# Patient Record
Sex: MALE | Race: WHITE | NOT HISPANIC OR LATINO | Employment: FULL TIME | URBAN - METROPOLITAN AREA
[De-identification: names, ages, dates, MRNs, and addresses within clinical notes are randomized per-mention and may not be internally consistent; named-entity substitution may affect disease eponyms.]

---

## 2021-12-08 ENCOUNTER — HOSPITAL ENCOUNTER (EMERGENCY)
Facility: CLINIC | Age: 21
Discharge: HOME | End: 2021-12-08
Attending: EMERGENCY MEDICINE | Admitting: EMERGENCY MEDICINE
Payer: COMMERCIAL

## 2021-12-08 ENCOUNTER — TELEPHONE (OUTPATIENT)
Dept: EMERGENCY MEDICINE | Facility: CLINIC | Age: 21
End: 2021-12-08

## 2021-12-08 VITALS
HEART RATE: 76 BPM | RESPIRATION RATE: 18 BRPM | SYSTOLIC BLOOD PRESSURE: 169 MMHG | TEMPERATURE: 97.8 F | OXYGEN SATURATION: 99 % | DIASTOLIC BLOOD PRESSURE: 90 MMHG | WEIGHT: 315 LBS

## 2021-12-08 DIAGNOSIS — Z20.822 PERSON UNDER INVESTIGATION FOR COVID-19: ICD-10-CM

## 2021-12-08 DIAGNOSIS — B34.9 VIRAL SYNDROME: ICD-10-CM

## 2021-12-08 LAB
FLUAV RNA SPEC QL NAA+PROBE: NEGATIVE
FLUBV RNA RESP QL NAA+PROBE: NEGATIVE
SARS-COV-2 RNA RESP QL NAA+PROBE: POSITIVE

## 2021-12-08 PROCEDURE — 99283 EMERGENCY DEPT VISIT LOW MDM: CPT

## 2021-12-08 PROCEDURE — C9803 HOPD COVID-19 SPEC COLLECT: HCPCS

## 2021-12-08 PROCEDURE — 87636 SARSCOV2 & INF A&B AMP PRB: CPT | Performed by: EMERGENCY MEDICINE

## 2021-12-08 ASSESSMENT — ENCOUNTER SYMPTOMS
MYALGIAS: 1
JOINT SWELLING: 0
NAUSEA: 0
CONFUSION: 0
SHORTNESS OF BREATH: 0
CHILLS: 0
COUGH: 1
DIZZINESS: 0
HEADACHES: 1
SORE THROAT: 0
FEVER: 0
HEMATURIA: 0
ABDOMINAL PAIN: 0
FATIGUE: 1
VOMITING: 0
DYSURIA: 0
DIARRHEA: 0

## 2021-12-08 NOTE — ED TRIAGE NOTES
Pt presents to the ED with c/o fever, chills, body aches, and cough. Pt had negative COVID on Sunday 12/5/21. Pt unvaccinated against COVID.

## 2021-12-08 NOTE — Clinical Note
Chuck Meadows was seen and treated in our emergency department on 12/8/2021.  He may return to work on 12/13/2021.       If you have any questions or concerns, please don't hesitate to call.      Narayan Storey MD

## 2021-12-09 NOTE — TELEPHONE ENCOUNTER
"-Coronavirus (COVID-19) Notification    I informed him I can transfer him to dept that needs to update his address w/ correct apt. He needed an email, I mentioned I couldn't not supply that for him due to HIPAA, encouraged him to sign up for [employer].    Caller Name (Patient, parent, daughter/son, grandparent, etc)  Pt    Reason for call  Notify of Positive Coronavirus (COVID-19) lab results, assess symptoms,  review [facility] recommendations    Lab Result    Lab test:  2019-nCoV rRt-PCR or SARS-CoV-2 PCR    Oropharyngeal AND/OR nasopharyngeal swabs is POSITIVE for 2019-nCoV RNA/SARS-COV-2 PCR (COVID-19 virus)    RN Recommendations/Instructions per [facility] Coronavirus COVID-19 recommendations    Brief introduction script  Introduce self then review script:  \"I am calling on behalf of [employer].  We were notified that your Coronavirus test (COVID-19) for was POSITIVE for the virus.  I have some information to relay to you but first I wanted to mention that the [place] Dept of Health will be contacting you shortly [it's possible MD already called Patient] to talk to you more about how you are feeling and other people you have had contact with who might now also have the virus.  Also, [facility] is Partnering with the [facility] for Covid-19 research, you may be contacted directly by research staff.\"    Assessment (Inquire about Patient's current symptoms)   Assessment   Current Symptoms at time of phone call: (if no symptoms, document No symptoms] Cough, body aches, sore throat, congestion, headache, nausea, vomit x1   Symptoms onset (if applicable) 12/6     If at time of call, Patients symptoms hare worsened, the Patient should contact 911 or have someone drive them to Emergency Dept promptly:      If Patient calling 911, inform 911 personal that you have tested positive for the Coronavirus (COVID-19).  Place mask on and await 911 to arrive.    If Emergency Dept, If possible, please " "have another adult drive you to the Emergency Dept but you need to wear mask when in contact with other people.      Monoclonal Antibody Administration    You may be eligible to receive a new treatment with a monoclonal antibody for preventing hospitalization in patients at high risk for complications from COVID-19.   This medication is still experimental and available on a limited basis; it is given through an IV and must be given at an infusion center. Please note that not all people who are eligible will receive the medication since it is in limited supply.     Are you interested in being considered for this medication?  No.   Does the patient fit the criteria: Patient declined    If patient qualifies based on above criteria:  \"You will be contacted if you are selected to receive this treatment in the next 1-2 business days.   This is time sensitive and if you are not selected in the next 1-2 business days, you will not receive the medication.  If you do not receive a call to schedule, you have not been selected.\"      Review information with Patient    Your result was positive. This means you have COVID-19 (coronavirus).  We have sent you a letter that reviews the information that I'll be reviewing with you now.    How can I protect others?    If you have symptoms: stay home and away from others (self-isolate) until:    You've had no fever--and no medicine that reduces fever--for 1 full day (24 hours). And       Your other symptoms have gotten better. For example, your cough or breathing has improved. And     At least 10 days have passed since your symptoms started. (If you've been told by a doctor that you have a weak immune system, wait 20 days.)     If you don't have symptoms: Stay home and away from others (self-isolate) until at least 10 days have passed since your first positive COVID-19 test. (Date test collected)    During this time:    Stay in your own room, including for meals. Use your own bathroom if " you can.    Stay away from others in your home. No hugging, kissing or shaking hands. No visitors.     Don't go to work, school or anywhere else.     Clean  high touch  surfaces often (doorknobs, counters, handles, etc.). Use a household cleaning spray or wipes. You'll find a full list on the EPA website at www.epa.gov/pesticide-registration/list-n-disinfectants-use-against-sars-cov-2.     Cover your mouth and nose with a mask, tissue or other face covering to avoid spreading germs.    Wash your hands and face often with soap and water.    Make a list of people you have been in close contact with recently, even if either of you wore a face covering.   ; Start your list from 2 days before you became ill or had a positive test.  ; Include anyone that was within 6 feet of you for a cumulative total of 15 minutes or more in 24 hours. (Example: if you sat next to Dejan for 5 minutes in the morning and 10 minutes in the afternoon, then you were in close contact for 15 minutes total that day. Dejan would be added to your list.)    A public health worker will call or text you. It is important that you answer. They will ask you questions about possible exposures to COVID-19, such as people you have been in direct contact with and places you have visited.    Tell the people on your list that you have COVID-19; they should stay away from others for 14 days starting from the last time they were in contact with you (unless you are told something different from a public health worker).     Caregivers in these groups are at risk for severe illness due to COVID-19:  o People 65 years and older  o People who live in a nursing home or long-term care facility  o People with chronic disease (lung, heart, cancer, diabetes, kidney, liver, immunologic)  o People who have a weakened immune system, including those who:  - Are in cancer treatment  - Take medicine that weakens the immune system, such as corticosteroids  - Had a bone marrow or  organ transplant  - Have an immune deficiency  - Have poorly controlled HIV or AIDS  - Are obese (body mass index of 40 or higher)  - Smoke regularly    Caregivers should wear gloves while washing dishes, handling laundry and cleaning bedrooms and bathrooms.    Wash and dry laundry with special caution. Don't shake dirty laundry, and use the warmest water setting you can.    If you have a weakened immune system, ask your doctor about other actions you should take.    For more tips, go to www.cdc.gov/coronavirus/2019-ncov/downloads/10Things.pdf.    You should not go back to work until you meet the guidelines above for ending your home isolation. You don't need to be retested for COVID-19 before going back to work--studies show that you won't spread the virus if it's been at least 10 days since your symptoms started (or 20 days, if you have a weak immune system).    Employers: This document serves as formal notice of your employee's medical guidelines for going back to work. They must meet the above guidelines before going back to work in person.    How can I take care of myself?    1. Get lots of rest. Drink extra fluids (unless a doctor has told you not to).    2. Take Tylenol (acetaminophen) for fever or pain. If you have liver or kidney problems, ask your family doctor if it's okay to take Tylenol.     Take either:     650 mg (two 325 mg pills) every 4 to 6 hours, or     1,000 mg (two 500 mg pills) every 8 hours as needed.     Note: Don't take more than 3,000 mg in one day. Acetaminophen is found in many medicines (both prescribed and over-the-counter medicines). Read all labels to be sure you don't take too much.    For children, check the Tylenol bottle for the right dose (based on their age or weight).    3. If you have other health problems (like cancer, heart failure, an organ transplant or severe kidney disease): Call your specialty clinic if you don't feel better in the next 2 days.    4. Know when to call  911: Emergency warning signs include:    Trouble breathing or shortness of breath    Pain or pressure in the chest that doesn't go away    Feeling confused like you haven't felt before, or not being able to wake up    Bluish-colored lips or face    5. Sign up for [employer]. We know it's scary to hear that you have COVID-19. We want to track your symptoms to make sure you're okay over the next 2 weeks. Please look for an email from [employer]--this is a free, online program that we'll use to keep in touch. To sign up, follow the link in the email. Learn more at [email or web address].    Where can I get more information?    [facility]: [email or web address]    Coronavirus Basics: www.health.[facility].[place]/diseases/coronavirus/basics.html    What to Do If You're Sick: www.cdc.gov/coronavirus/2019-ncov/about/steps-when-sick.html    Ending Home Isolation: www.cdc.gov/coronavirus/2019-ncov/hcp/disposition-in-home-patients.html     Caring for Someone with COVID-19: www.cdc.gov/coronavirus/2019-ncov/if-you-are-sick/care-for-someone.html     [facility] clinical trials (COVID-19 research studies): clinicalaffairs.[facility]/umn-clinical-trials     A Positive COVID-19 letter will be sent via [employer] or the mail. (Exception, no letters sent to Presurgerical/Preprocedure Patients)    Tali Gonzalez

## 2021-12-09 NOTE — ED PROVIDER NOTES
Emergency Department Encounter     Evaluation Date & Time:   No admission date for patient encounter.    CHIEF COMPLAINT:  Fever, Cough, and Generalized Body Aches      Triage Note:Pt presents to the ED with c/o fever, chills, body aches, and cough. Pt had negative COVID on Sunday 12/5/21. Pt unvaccinated against COVID.         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  Pt has no cp/sob, vomiting, loss of sense of taste/smell.  Covid/flu test done already and still pending.  Pt will follow up on this, work note provided for tomorrow as it's his last day of work this week.  Will return to work when improving and if covid negative.  Pt will continue tylenol/ibuprofen, return to ED for worsening concerns. Vitals here all normal, clear lungs, appropriate for discharge.        6:12 PM I met with patient for initial interview and encounter. PPE worn includes N95 mask, surgical mask, goggles.    At the conclusion of the encounter I discussed the results of all the tests and the disposition. The questions were answered. The patient or family acknowledged understanding and was agreeable with the care plan.      MEDICATIONS GIVEN IN THE EMERGENCY DEPARTMENT:  Medications - No data to display    NEW PRESCRIPTIONS STARTED AT TODAY'S ED VISIT:  There are no discharge medications for this patient.      HPI   HPI     Chuck Meadows is a 21 year old male with no pertinent history who presents to this ED via walk-in for evaluation of fever, cough.    Patient reports he has had ongoing cough, congestion, headache, body aches, and fatigue for the past 2 days. He has been treating his symptoms with tylenol. He reports having a negative COVID-19 test on 12/5 (3 days ago) before he attended an event. States he thinks he has been around sick people, but denies any specific exposures.  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     Patient has not been vaccinated against COVID-19.    Patient denies any loss of taste or smell, nausea, vomiting, diarrhea, chest pain, shortness of breath, abdominal pain, or any other complaints.    REVIEW OF SYSTEMS:  Review of Systems   Constitutional: Positive for fatigue. Negative for chills and fever.   HENT: Positive for congestion. Negative for sore throat.         Negative for loss of taste or smell   Eyes: Negative for visual disturbance.   Respiratory: Positive for cough. Negative for shortness of breath.    Cardiovascular: Negative for chest pain.   Gastrointestinal: Negative for abdominal pain, diarrhea, nausea and vomiting.   Endocrine: Negative for polyuria.   Genitourinary: Negative for dysuria and hematuria.        - urinary changes     Musculoskeletal: Positive for myalgias. Negative for joint swelling.   Skin: Negative for rash.   Neurological: Positive for headaches. Negative for dizziness.   Psychiatric/Behavioral: Negative for confusion.   All other systems reviewed and are negative.          Medical History   History reviewed. No pertinent past medical history.    History reviewed. No pertinent surgical history.    No family history on file.    Social History     Tobacco Use     Smoking status: None     Smokeless tobacco: None   Substance Use Topics     Alcohol use: None     Drug use: None       No current outpatient medications on file.      Physical Exam       Vitals:  BP (!) 169/90   Pulse 76   Temp 97.8  F (36.6  C) (Oral)   Resp 18   Wt (!) 154.2 kg (340 lb)   SpO2 99%     PHYSICAL EXAM:   Physical Exam  Vitals and nursing note reviewed.   Constitutional:       General: He is not in acute distress.     Appearance: Normal appearance.   HENT:      Head: Normocephalic and atraumatic.      Nose: Nose normal.      Mouth/Throat:      Mouth: Mucous membranes are  moist.   Eyes:      Extraocular Movements: Extraocular movements intact.   Cardiovascular:      Rate and Rhythm: Normal rate and regular rhythm.      Pulses: Normal pulses.           Radial pulses are 2+ on the right side and 2+ on the left side.        Dorsalis pedis pulses are 2+ on the right side and 2+ on the left side.   Pulmonary:      Effort: Pulmonary effort is normal.   Skin:     Findings: No rash.   Neurological:      General: No focal deficit present.      Mental Status: He is alert. Mental status is at baseline.      Comments: Fluent speech   Psychiatric:         Mood and Affect: Mood normal.         Behavior: Behavior normal.         Results     LAB:  All pertinent labs reviewed and interpreted  Labs Ordered and Resulted from Time of ED Arrival to Time of ED Departure - No data to display    RADIOLOGY:  No orders to display                  PROCEDURES:  Procedures:  none      FINAL IMPRESSION:    ICD-10-CM    1. Person under investigation for COVID-19  Z20.822    2. Viral syndrome  B34.9        0 minutes of critical care time      I, Andrew Maciel, am serving as a scribe to document services personally performed by Dr. Narayan Storey, based on my observations and the provider's statements to me. INarayan, DO attest that Andrew Maciel is acting in a scribe capacity, has observed my performance of the services and has documented them in accordance with my direction.      Narayan Storey DO  Emergency Medicine  [facility] EMERGENCY ROOM  12/8/2021  6:12 PM        Narayan Storey MD  12/08/21 [id number]

## 2021-12-09 NOTE — DISCHARGE INSTRUCTIONS
Continue tylenol and alternate ibuprofen 600-800mg with food 3 times a day. Tylenol up to 1000mg up to 4 times a day.  Rest, drink plenty of fluids.  Follow up on covid/flu results on mychart.  Return to work when improving symptomatically.

## 2021-12-12 ENCOUNTER — HEALTH MAINTENANCE LETTER (OUTPATIENT)
Age: 21
End: 2021-12-12

## 2021-12-14 ENCOUNTER — TELEPHONE (OUTPATIENT)
Dept: NURSING | Facility: CLINIC | Age: 21
End: 2021-12-14
Payer: COMMERCIAL

## 2021-12-14 NOTE — TELEPHONE ENCOUNTER
Chuck has Covid    He wants to know when he can be vaccinated    Notified per CDC:  People with COVID-19 who have symptoms should wait to be vaccinated until they have recovered from their illness and have met the criteria for discontinuing isolation    Notified of CDC criteria:  You can be around others after:    - 10 days since symptoms first appeared and  - 24 hours with no fever without the use of fever-reducing medications and  - Other symptoms of COVID-19 are improving    COVID 19 Nurse Triage Plan/Patient Instructions    Please be aware that novel coronavirus (COVID-19) may be circulating in the community. If you develop symptoms such as fever, cough, or SOB or if you have concerns about the presence of another infection including coronavirus (COVID-19), please contact your health care provider or visit [email or web address].     Disposition/Instructions    Thank you for taking steps to prevent the spread of this virus.  o Limit your contact with others.  o Wear a simple mask to cover your cough.  o Wash your hands well and often.    Resources    [facility]: About COVID-19: [email or web address]    CDC: What to Do If You're Sick: www.cdc.gov/coronavirus/2019-ncov/about/steps-when-sick.html    CDC: Ending Home Isolation: www.cdc.gov/coronavirus/2019-ncov/hcp/disposition-in-home-patients.html     CDC: Caring for Someone: www.cdc.gov/coronavirus/2019-ncov/if-you-are-sick/care-for-someone.html     [facility]: Interim Guidance for Hospital Discharge to Home: www.health.[facility].[place]/diseases/coronavirus/hcp/hospdischarge.pdf    [facility] clinical trials (COVID-19 research studies): clinicalaffairs.[facility]-clinical-trials     Below are the COVID-19 hotlines at the [place] Department of Health ([facility]). Interpreters are available.   o For health questions: Call 526-111-0327 or 1-142.299.8033 (7 a.m. to 7 p.m.)  o For questions about schools and childcare: Call 160-248-8305 or  1-616-113-1383 (7 a.m. to 7 p.m.)     Camille Huerta RN  [facility]

## 2022-10-03 ENCOUNTER — HEALTH MAINTENANCE LETTER (OUTPATIENT)
Age: 22
End: 2022-10-03

## 2022-10-24 ENCOUNTER — HOSPITAL ENCOUNTER (EMERGENCY)
Facility: CLINIC | Age: 22
Discharge: HOME | End: 2022-10-24
Admitting: PHYSICIAN ASSISTANT
Payer: COMMERCIAL

## 2022-10-24 VITALS
TEMPERATURE: 97.6 F | DIASTOLIC BLOOD PRESSURE: 105 MMHG | BODY MASS INDEX: 37.19 KG/M2 | OXYGEN SATURATION: 98 % | HEART RATE: 85 BPM | WEIGHT: 315 LBS | SYSTOLIC BLOOD PRESSURE: 176 MMHG | RESPIRATION RATE: 18 BRPM | HEIGHT: 77 IN

## 2022-10-24 DIAGNOSIS — U07.1 INFECTION DUE TO 2019 NOVEL CORONAVIRUS: ICD-10-CM

## 2022-10-24 LAB
FLUAV RNA SPEC QL NAA+PROBE: NEGATIVE
FLUBV RNA RESP QL NAA+PROBE: NEGATIVE
RSV RNA SPEC NAA+PROBE: NEGATIVE
SARS-COV-2 RNA RESP QL NAA+PROBE: POSITIVE

## 2022-10-24 PROCEDURE — 99283 EMERGENCY DEPT VISIT LOW MDM: CPT | Mod: CS

## 2022-10-24 PROCEDURE — 87637 SARSCOV2&INF A&B&RSV AMP PRB: CPT | Performed by: PHYSICIAN ASSISTANT

## 2022-10-24 PROCEDURE — C9803 HOPD COVID-19 SPEC COLLECT: HCPCS

## 2022-10-24 RX ORDER — ACETAMINOPHEN 325 MG/1
975 TABLET ORAL ONCE
Status: DISCONTINUED | OUTPATIENT
Start: 2022-10-24 | End: 2022-10-24

## 2022-10-24 RX ORDER — IBUPROFEN 600 MG/1
600 TABLET, FILM COATED ORAL ONCE
Status: DISCONTINUED | OUTPATIENT
Start: 2022-10-24 | End: 2022-10-24 | Stop reason: HOSPADM

## 2022-10-24 ASSESSMENT — ENCOUNTER SYMPTOMS
FATIGUE: 1
GASTROINTESTINAL NEGATIVE: 1
MUSCULOSKELETAL NEGATIVE: 1
HEADACHES: 1
RESPIRATORY NEGATIVE: 1
CHILLS: 1
FEVER: 1

## 2022-10-24 NOTE — Clinical Note
Chuck Meadows was seen and treated in our emergency department on 10/24/2022.  He may return to work on 10/29/2022.       If you have any questions or concerns, please don't hesitate to call.      Isaac Lewis PA-C

## 2022-10-24 NOTE — ED TRIAGE NOTES
Pt presents to the ED with c/o body aches, Ha, and back pain since this morning. Denies fevers, cough, or emesis.

## 2022-10-24 NOTE — ED PROVIDER NOTES
EMERGENCY DEPARTMENT ENCOUNTER      NAME: Chuck Meadows  AGE: 22 year old male  YOB: 2000  MRN: [id number]  EVALUATION DATE & TIME: No admission date for patient encounter.    PCP: System, Provider Not In    ED PROVIDER: Isaac Lewis PA-C      Chief Complaint   Patient presents with     Generalized Body Aches     Back Pain     Headache         FINAL IMPRESSION:  1. Infection due to 2019 novel coronavirus          MEDICAL DECISION MAKING:    Pertinent Labs & Imaging studies reviewed. (See chart for details)  22 year old male presents to the Emergency Department for evaluation of fatigue, chills, headache.    After obtaining history present illness, reviewing vitals and examining the patient the patient was swabbed and tested positive for COVID today.  Patient is not hypoxic or tachycardic.  Currently other than his BMI there is no other risk factors therefore no antiviral therapy is initiated.  I will give him a work note for the next 5 days encouraged him to push fluids, focus on fever management in the form of Tylenol Motrin.  Patient knows he can return to the ED if there is worsening symptoms.      ED COURSE    I met with the patient, obtained history, performed an initial exam, and discussed options and plan for diagnostics and treatment here in the ED.    At the conclusion of the encounter I discussed the results of all of the tests and the disposition. The questions were answered. The patient or family acknowledged understanding and was agreeable with the care plan.     MEDICATIONS GIVEN IN THE EMERGENCY:  Medications   ibuprofen (ADVIL/MOTRIN) tablet 600 mg (has no administration in time range)       NEW PRESCRIPTIONS STARTED AT TODAY'S ER VISIT  There are no discharge medications for this patient.           =================================================================    HPI    Patient information was obtained from: Patient  Chuck Meadows is a 22 year old male who presents to this  "ED for evaluation of fever, chills, headache, fatigue and body aches.  Patient denies any cough or shortness of breath.  No complaints of abdominal pain, nausea, vomiting, or diarrhea.  Patient denies any recent ill contacts.  He has used some Tylenol earlier in the day with improvement in his headache.  Patient has had COVID about a year ago, this feels somewhat similar.      REVIEW OF SYSTEMS   Review of Systems   Constitutional: Positive for chills, fatigue and fever.   HENT: Negative.    Respiratory: Negative.    Gastrointestinal: Negative.    Genitourinary: Negative.    Musculoskeletal: Negative.    Neurological: Positive for headaches.   All other systems reviewed and are negative.         PAST MEDICAL HISTORY:  No past medical history on file.    PAST SURGICAL HISTORY:  No past surgical history on file.      CURRENT MEDICATIONS:      Current Facility-Administered Medications:      ibuprofen (ADVIL/MOTRIN) tablet 600 mg, 600 mg, Oral, Once, Isaac Lewis PA-C  No current outpatient medications on file.      ALLERGIES:  No Known Allergies    FAMILY HISTORY:  No family history on file.    SOCIAL HISTORY:   Social History     Socioeconomic History     Marital status: Single       VITALS:  Patient Vitals for the past 24 hrs:   BP Temp Temp src Pulse Resp SpO2 Height Weight   10/24/22 1655 (!) 176/105 97.6  F (36.4  C) Temporal 85 18 98 % 1.956 m (6' 5\") (!) 163.3 kg (360 lb)       PHYSICAL EXAM    Physical Exam  Vitals and nursing note reviewed.   Constitutional:       General: He is not in acute distress.     Appearance: He is obese. He is not ill-appearing or toxic-appearing.   HENT:      Head: Normocephalic.      Right Ear: External ear normal.      Left Ear: External ear normal.   Eyes:      Conjunctiva/sclera: Conjunctivae normal.   Cardiovascular:      Rate and Rhythm: Normal rate.      Pulses: Normal pulses.   Pulmonary:      Effort: Pulmonary effort is normal. No respiratory distress. "   Musculoskeletal:         General: No tenderness or deformity. Normal range of motion.      Cervical back: Normal range of motion.   Skin:     General: Skin is warm and dry.      Findings: No rash.   Neurological:      General: No focal deficit present.      Mental Status: He is alert. Mental status is at baseline.      Sensory: No sensory deficit.      Motor: No weakness.   Psychiatric:         Mood and Affect: Mood normal.          LAB:  All pertinent labs reviewed and interpreted.  Results for orders placed or performed during the hospital encounter of 10/24/22   Symptomatic; Yes; 10/23/2022 Influenza A/B & SARS-CoV2 (COVID-19) Virus PCR Multiplex Nasopharyngeal    Specimen: Nasopharyngeal; Swab   Result Value Ref Range    Influenza A PCR Negative Negative    Influenza B PCR Negative Negative    RSV PCR Negative Negative    SARS CoV2 PCR Positive (A) Negative       RADIOLOGY:  Reviewed all pertinent imaging. Please see official radiology report.  No orders to display           Isaac Lewis PA-C  Emergency Medicine  [facility]     Isaac Lewis PA-C  10/24/22 [id number]

## 2022-11-27 ENCOUNTER — HOSPITAL ENCOUNTER (EMERGENCY)
Facility: CLINIC | Age: 22
Discharge: HOME | End: 2022-11-27
Admitting: NURSE PRACTITIONER
Payer: COMMERCIAL

## 2022-11-27 VITALS
HEIGHT: 78 IN | DIASTOLIC BLOOD PRESSURE: 91 MMHG | HEART RATE: 102 BPM | WEIGHT: 315 LBS | RESPIRATION RATE: 18 BRPM | SYSTOLIC BLOOD PRESSURE: 137 MMHG | BODY MASS INDEX: 36.45 KG/M2 | TEMPERATURE: 98 F | OXYGEN SATURATION: 98 %

## 2022-11-27 DIAGNOSIS — R51.9 HEADACHE: ICD-10-CM

## 2022-11-27 PROCEDURE — 99283 EMERGENCY DEPT VISIT LOW MDM: CPT

## 2022-11-27 ASSESSMENT — ENCOUNTER SYMPTOMS
FEVER: 0
HEADACHES: 1
NECK PAIN: 0
WEAKNESS: 0
FOCAL WEAKNESS: 0
EYES NEGATIVE: 1
CHILLS: 0
SENSORY CHANGE: 0
DIZZINESS: 0

## 2022-11-27 NOTE — ED PROVIDER NOTES
EMERGENCY DEPARTMENT ENCOUNTER      NAME: Chuck Meadows  AGE: 22 year old male  YOB: 2000  MRN: [id number]  EVALUATION DATE & TIME: No admission date for patient encounter.    PCP: System, Provider Not In    ED PROVIDER: DANA Myers CNP      Chief Complaint   Patient presents with     Headache         FINAL IMPRESSION:  No diagnosis found.      ED COURSE & MEDICAL DECISION MAKIN:38 PM I met with the patient, obtained history, performed an initial exam, and discussed options and plan for treatment here in the ED.    Pertinent Labs & Imaging studies reviewed. (See chart for details)  22 year old male presents to the Emergency Department for evaluation of headache.  Started mild and gradually worsening.  No infectious symptoms.  No trauma.  Neuro exam nonfocal.  Patient admitted that he missed work and is simply here to get a work note.  Does not seem to have any concerning features at this point.  Not the worst headache of his life.  Took Tylenol.  Recommend he take ibuprofen, stay hydrated, rest. Discussed follow up recommendations and given return precautions.     At the conclusion of the encounter I discussed the results of all of the tests and the disposition. The questions were answered. The patient or family acknowledged understanding and was agreeable with the care plan.       MEDICATIONS GIVEN IN THE EMERGENCY:  Medications - No data to display    NEW PRESCRIPTIONS STARTED AT TODAY'S ER VISIT  New Prescriptions    No medications on file            =================================================================    HPI    Patient information was obtained from: patient    Use of Intrepreter: N/A        Chuck Meadows is a 22 year old healthy male who presents for evaluation of headache. Started last night. Started mild and has gotten more severe. Not his worst headache. No fever, trauma, vision changes, nausea, vomiting, problems with corrdination, or neck stiffness.   States he missed work because of his headache.  He is here simply to get up.      REVIEW OF SYSTEMS   Review of Systems   Constitutional: Negative for chills and fever.   HENT: Negative.    Eyes: Negative.    Musculoskeletal: Negative for neck pain.   Neurological: Positive for headaches. Negative for dizziness, sensory change, focal weakness and weakness.   All other systems reviewed and are negative.      PAST MEDICAL HISTORY:  No past medical history on file.    PAST SURGICAL HISTORY:  No past surgical history on file.        CURRENT MEDICATIONS:    No current facility-administered medications for this encounter.     No current outpatient medications on file.         ALLERGIES:  No Known Allergies    FAMILY HISTORY:  No family history on file.    SOCIAL HISTORY:   Social History     Socioeconomic History     Marital status: Single         VITALS:  Patient Vitals for the past 24 hrs:   Pulse   11/27/22 1636 108       PHYSICAL EXAM    Constitutional:  Well developed, well nourished, no acute distress  EYES: Conjunctivae clear. PERRL. EOM's intact  HENT:  Atraumatic, normocephalic Bilateral external ears normal, nose normal, oropharynx moist. Neck- supple   Respiratory:  No respiratory distress  Integument: Warm, Dry  Neurologic:  Alert & oriented x 3. CN 3-12 grossly intact. Sensorimotor function in the upper extremities intact.               LAB:  All pertinent labs reviewed and interpreted.  Labs Ordered and Resulted from Time of ED Arrival to Time of ED Departure - No data to display      RADIOLOGY:  Reviewed all pertinent imaging. Please see official radiology report.  No orders to display             PROCEDURES:   None      DANA Myers, CNP  Emergency Medicine  [facility] EMERGENCY ROOM  [address] [facility] [id number] [phone]  Dept: [phone]         Narayan Alonso APRN CNP  11/27/22 7317

## 2022-11-27 NOTE — ED TRIAGE NOTES
Pt presents to the ED with c/o of a headache that started yesterday and has not been helped with medications.      Triage Assessment     Row Name 11/27/22 0843       Triage Assessment (Adult)    Airway WDL WDL       Respiratory WDL    Respiratory WDL WDL       Cardiac WDL    Cardiac WDL WDL       Cognitive/Neuro/Behavioral WDL    Cognitive/Neuro/Behavioral WDL WDL       Sybil Coma Scale    Best Eye Response 4-->(E4) spontaneous    Best Motor Response 6-->(M6) obeys commands

## 2022-11-27 NOTE — DISCHARGE INSTRUCTIONS
*HEADACHE [unspecified]       The cause of your headache today is not clear, but it does not appear to be the sign of any serious illness.  Under stress, some people tense the muscles of their shoulder, neck and scalp without knowing it. If this condition lasts long enough, a TENSION HEADACHE can occur.  A MIGRAINE HEADACHE is caused by changes in blood flow to the brain. It can be mild or severe. A migraine attack may be triggered by emotional stress, hormone changes during the menstrual cycle, oral contraceptives, alcohol use, certain foods containing tyramine, eye strain, weather changes, missing meals, lack of sleep or oversleeping.  Other causes of headache include a viral illness, sinus, ear or throat infection, dental pain and TMJ (jaw joint) pain.  HOME CARE:  If you were given pain medicine for this headache, do not drive yourself home. Arrange for a ride, instead. When you get home, try to sleep. You should feel much better when you wake up.  If you are having nausea or vomiting, follow a light diet until your headache is relieved.  If you have a migraine type headache, use sunglasses when in the daylight or around bright indoor lighting until symptoms improve. Bright glaring light can worsen this kind of headache.  FOLLOW UP with your doctor if the headache is not better within the next 24 hours. If you have frequent headaches you should discuss a treatment plan with your primary care doctor. By being aware of the earliest signs of headache, and starting treatment right away, you may be able to stop the pain yourself.  GET PROMPT MEDICAL ATTENTION if any of the following occur:  Worsening of your head pain or no improvement within 24 hours  Repeated vomiting (unable to keep liquids down)  Fever over 101 F (38.3 C)  Stiff neck  Extreme drowsiness, confusion or fainting  Weakness of an arm or leg or one side of the face  Difficulty with speech or vision    [id number] [employer], [address] [place] [address]  [place] [address]. All rights reserved. This information is not intended as a substitute for professional medical care. Always follow your healthcare professional's instructions.

## 2022-11-27 NOTE — Clinical Note
Chuck Meadows was seen and treated in our emergency department on 11/27/2022.  He may return to work on 11/28/2022.       If you have any questions or concerns, please don't hesitate to call.      Narayan Alonso, DANA CNP

## 2023-02-03 ENCOUNTER — HOSPITAL ENCOUNTER (EMERGENCY)
Facility: CLINIC | Age: 23
Discharge: HOME | End: 2023-02-03
Admitting: PHYSICIAN ASSISTANT

## 2023-02-03 ENCOUNTER — APPOINTMENT (OUTPATIENT)
Dept: CT IMAGING | Facility: CLINIC | Age: 23
End: 2023-02-03
Attending: PHYSICIAN ASSISTANT

## 2023-02-03 VITALS
BODY MASS INDEX: 37.19 KG/M2 | DIASTOLIC BLOOD PRESSURE: 94 MMHG | HEART RATE: 98 BPM | WEIGHT: 315 LBS | SYSTOLIC BLOOD PRESSURE: 139 MMHG | TEMPERATURE: 98.3 F | OXYGEN SATURATION: 100 % | RESPIRATION RATE: 16 BRPM | HEIGHT: 77 IN

## 2023-02-03 DIAGNOSIS — K04.7 DENTAL INFECTION: ICD-10-CM
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
## 2023-02-03 LAB
CREAT SERPL-MCNC: 0.88 MG/DL (ref 0.7–1.3)
GFR SERPL CREATININE-BSD FRML MDRD: >90 ML/MIN/1.73M2

## 2023-02-03 PROCEDURE — 250N000011 HC RX IP 250 OP 636: Performed by: RADIOLOGY

## 2023-02-03 PROCEDURE — 82565 ASSAY OF CREATININE: CPT | Performed by: PHYSICIAN ASSISTANT

## 2023-02-03 PROCEDURE — 36415 COLL VENOUS BLD VENIPUNCTURE: CPT | Performed by: PHYSICIAN ASSISTANT

## 2023-02-03 PROCEDURE — 250N000013 HC RX MED GY IP 250 OP 250 PS 637: Performed by: PHYSICIAN ASSISTANT

## 2023-02-03 PROCEDURE — 99285 EMERGENCY DEPT VISIT HI MDM: CPT | Mod: 25

## 2023-02-03 PROCEDURE — 70491 CT SOFT TISSUE NECK W/DYE: CPT

## 2023-02-03 RX ORDER — IOPAMIDOL 755 MG/ML
100 INJECTION, SOLUTION INTRAVASCULAR ONCE
Status: COMPLETED | OUTPATIENT
Start: 2023-02-03 | End: 2023-02-03

## 2023-02-03 RX ADMIN — IOPAMIDOL 100 ML: 755 INJECTION, SOLUTION INTRAVENOUS at 20:17

## 2023-02-03 RX ADMIN — AMOXICILLIN AND CLAVULANATE POTASSIUM 1 TABLET: 875; 125 TABLET, FILM COATED ORAL at 21:34

## 2023-02-03 ASSESSMENT — ENCOUNTER SYMPTOMS
NECK PAIN: 0
FACIAL SWELLING: 1
WEAKNESS: 0
NUMBNESS: 0
VOMITING: 0
TROUBLE SWALLOWING: 0
SPEECH DIFFICULTY: 0
HEADACHES: 0
NAUSEA: 0
CHILLS: 0
FEVER: 0

## 2023-02-03 ASSESSMENT — ACTIVITIES OF DAILY LIVING (ADL)
ADLS_ACUITY_SCORE: 35
ADLS_ACUITY_SCORE: 35

## 2023-02-03 NOTE — ED TRIAGE NOTES
Pt having pain in the left jaw 2 days now . Did state that he had a fall and hit his jaw on the ground outside,  on the ice hitting left jaw. He states he doesn't follow a dentist and knows he has bad teeth.    CHRYSTAL DC RN       Triage Assessment     Row Name 02/03/23 1133       Triage Assessment (Adult)    Airway WDL WDL       Respiratory WDL    Respiratory WDL WDL       Skin Circulation/Temperature WDL    Skin Circulation/Temperature WDL WDL       Cardiac WDL    Cardiac WDL WDL       Peripheral/Neurovascular WDL    Peripheral Neurovascular WDL WDL       Cognitive/Neuro/Behavioral WDL    Cognitive/Neuro/Behavioral WDL WDL

## 2023-02-04 NOTE — ED PROVIDER NOTES
Emergency Department Encounter   NAME: Chuck Meadows ; AGE: 22 year old male ; YOB: 2000 ; MRN: [id number] ; PCP: No Ref-Primary, Physician   ED PROVIDER: Priscilla Hernandez PA-C    Evaluation Date & Time:   2/3/2023  6:12 PM    CHIEF COMPLAINT:  Dental Injury and Facial Injury      Impression and Plan   MDM: Chuck Meadows is a 22 year old male, history reviewed and nothing pertinent, who presents to the ED by private car for evaluation of jaw pain.  The patient presents to the emergency department for evaluation of left lower jaw pain and swelling after he slipped on ice 2 days ago and hit his left mandible on the pavement.  He does feel that one of his left lower molars is loose, and likens pain similar to a dental infection.  Here in the ER, he does have appreciable facial swelling along his left mandible with tenderness both to the soft tissue and along the mandibular bone with swelling along the left lower gumline and tenderness to palpation of tooth #21 though without obvious dental fracture or looseness of the tooth, no open wounds in the mouth.  Considered a broad differential, highest concern today for mandibular fracture, jaw hematoma, versus dental abscess/infection.  No evidence of Josef angina or deep soft tissue space infection.  We discussed plan to obtain CT of his jaw and soft tissues to further evaluate.  He declined anything for pain at this time.    CT shows no evidence of acute mandibular fracture.  He does have a periapical lucency involving his left second mandibular premolar with overlying soft tissue inflammatory change which is the likely source of his symptoms.  There is no appreciable drainable abscess.  Nothing to suggest Josef angina.  He has additional scattered dental and periodontal disease throughout.  At this time, his symptoms are most consistent with a dental infection.  He does not have any evidence of buccal cellulitis or facial cellulitis.  He is  appropriate for trial of oral antibiotics for home and close follow-up with dentistry with strict return precautions.  Patient is comfortable with the plan.  First dose of Augmentin given in the ER and I will give him 4 tablets of Tylenol 3 for breakthrough pain at home.  Reviewed concerning signs and symptoms to return to the ER and he verbalized understanding.  Discharged home in stable condition.    Medical Decision Making    History:    Supplemental history from: N/A    External Record(s) reviewed: Documented in chart, if applicable.    Work Up:    Chart documentation includes differential considered and any EKGs or imaging independently interpreted by provider, where specified.    In additional to work up documented, I considered the following work up: Documented in chart, if applicable.    External consultation:    Discussion of management with another provider: Documented in chart, if applicable    Complicating factors:    Care impacted by chronic illness: N/A    Care affected by social determinants of health: Access to Medical Care    Disposition considerations: Discharge. I prescribed additional prescription strength medication(s) as charted. See documentation for any additional details.    ED COURSE:  6:36 PM I met and introduced myself to the patient. I gathered initial history and performed my physical exam. We discussed plan for initial workup.   6:45 PM I called radiology tech and discussed imaging modalities.   9:05 PM Rechecked and updated the patient. We discussed the plan for discharge and the patient is agreeable. Reviewed supportive cares, symptomatic treatment, outpatient follow up, and reasons to return to the Emergency Department. Patient to be discharged by ED RN.     At the conclusion of the encounter I discussed the results of all the tests and the disposition. The questions were answered. The patient or family acknowledged understanding and was agreeable with the care plan.    FINAL  IMPRESSION:  No diagnosis found.      MEDICATIONS GIVEN IN THE EMERGENCY DEPARTMENT:  Medications   iopamidol (ISOVUE-370) solution 100 mL (100 mLs Intravenous Given 2/3/23 2017)         NEW PRESCRIPTIONS STARTED AT TODAY'S ED VISIT:  New Prescriptions    No medications on file         HPI   Patient information was obtained from: Patient   Use of Intrepreter: N/A    Chuck Meadows is a 22 year old male, history reviewed and nothing pertinent, who presents to the ED by private car for evaluation of jaw pain.     The patient reports he slipped on ice 2 days ago and fell, hitting his left jaw on the pavement. When he fell he did not lose consciousness. Since the fall he has had constant left jaw pain. Yesterday morning he noticed his left jaw appeared swollen. Since the fall he has also noticed a loose tooth in his left lower mouth. He has tried heat, ice, and salt water rinses for the pain without much improvement.     He has known cavities, but recently [facility] and only recently was able to establish with a dental clinic. He has an appointment with a dentist in a couple weeks and is on the wait list for any appointments that open up before his scheduled appointment.    He otherwise denies headache, vision changes, speech changes, nausea, vomiting, focal weakness or numbness, neck pain, and any other complaints at this time.    REVIEW OF SYSTEMS:  Review of Systems   Constitutional: Negative for chills and fever.   HENT: Positive for dental problem and facial swelling (left jaw). Negative for trouble swallowing.         Positive for left jaw pain   Eyes: Negative for visual disturbance.   Gastrointestinal: Negative for nausea and vomiting.   Musculoskeletal: Negative for neck pain.   Neurological: Negative for syncope, speech difficulty, weakness, numbness and headaches.   All other systems reviewed and are negative.    Medical History     No past medical history on file.    No past surgical  "history on file.    No family history on file.         No current outpatient medications on file.        Physical Exam     First Vitals:  Patient Vitals for the past 24 hrs:   BP Temp Temp src Pulse Resp SpO2 Height Weight   02/03/23 1945 (!) 139/94 -- -- 98 16 100 % -- --   02/03/23 1715 (!) 177/100 98.3  F (36.8  C) Oral 107 16 97 % 1.956 m (6' 5\") (!) 158.8 kg (350 lb)         PHYSICAL EXAM:   Physical Exam  Vitals and nursing note reviewed.   Constitutional:       General: He is not in acute distress.     Appearance: He is not toxic-appearing.   HENT:      Head: Normocephalic.      Comments: Facial swelling along left mandible with tenderness to the soft tissue and bone. No open wounds. No other facial tenderness or step offs.      Mouth/Throat:      Mouth: Mucous membranes are moist.      Comments: Poor dentition throughout. Tooth #21 is tender to palpation - no appreciated cracks or significant looseness. No open wounds in the mouth. There is significant swelling along the gum line to left lower jaw. No palpable periapical abscess. No sublingual swelling or tenderness. No trismus.  No submandibular swelling or fullness.  No jaw malalignment.  Eyes:      Conjunctiva/sclera: Conjunctivae normal.      Pupils: Pupils are equal, round, and reactive to light.   Musculoskeletal:      Cervical back: Normal range of motion and neck supple.   Skin:     General: Skin is warm and dry.   Neurological:      Mental Status: He is alert and oriented to person, place, and time. Mental status is at baseline.      GCS: GCS eye subscore is 4. GCS verbal subscore is 5. GCS motor subscore is 6.      Comments: Cranial nerves III through XII intact.  Moving all extremities.  No facial asymmetry.  Sensation to light touch intact to face.  EOMs are intact.  Normal gait.             Results     LAB:  All pertinent labs reviewed and interpreted  Labs Ordered and Resulted from Time of ED Arrival to Time of ED Departure   CREATININE - " Normal       Result Value    Creatinine 0.88      GFR Estimate >90         RADIOLOGY:  CT Soft Tissue Neck w Contrast   Final Result   IMPRESSION:    1.  A periapical lucency involving the left second mandibular premolar with buccal cortical breakthrough and marked overlying soft tissue inflammatory change. No drainable neck abscess at this time.   2.  Additional scattered dental and periodontal disease, as above.   3.  No acute fracture.   4.  Reactive cervical lymphadenopathy.          I, Milo Palmer, am serving as a scribe to document services personally performed by Priscilla Hernandez PA-C, based on my observation and the provider's statements to me. IPriscilla PA-C attest that Milo Palmer is acting in a scribe capacity, has observed my performance of the services and has documented them in accordance with my direction.       Priscilla Hernandez PA-C   Emergency Medicine   [facility] EMERGENCY ROOM       Priscilla Hernandez PA-C  02/03/23 3374

## 2023-02-04 NOTE — DISCHARGE INSTRUCTIONS
You were seen at the Hospital Emergency Department. Please bring this paper work to your followup appointment for the next provider to review and continue providing your care.  We believe that an infected tooth is the cause of your dental pain. For your infection, please take the antibiotic Augmentin as prescribed. please see a dentist. There is a list of several low/no cost dental clinics below.  I will give you several tablets of Tylenol with codeine for breakthrough pain.  This is a strong pain medicine and can be addictive.  Please do not take it while working, driving, or operating heavy machinery.  If at anytime you develop a fever, worsening facial pain or swelling, swelling under your chin, difficulty breathing or swallowing, please return to the ER for further evaluation.  Take Ibuprofen for pain.   Ibuprofen 600 mg every 6 hours (Max 3200 mg per day)  Please take your medicines as recommended above and review the discharge instructions for concerning signs/symptoms thatwould require your prompt return to the emergency department for further evaluation. Please follow up in clinic as we have recommended below. If your symptoms worsen prior to your follow up appointment, do not hesitate toreturn here to the emergency department for further evaluation. We'd be happy to see you again.   ---------------------------------------------------------------------------------------------------  Dental Clinics with no or reduced fees  [facility]   The Dental Emergency Room  [address], [facility]  [phone] Accepts [place]    [facility]  [address], [facility]  [phone] No Fee Hours and services vary each month - call them before going. Sometimes only offer extractions.   [facility]  [address], [facility]  [phone] Sliding fee: ER visit - $50 up front  regular - $35 up front Call or arrive at 7:45 AM on Mondays, Tues, or Thursdays to sign up for same-dayappointments  for dental pain / emergencies.        [facility] Sliding fee  Call fordetails Walk-ins and same-day appointments  Walk-in's accepted 8-11AM and 1-4PM  Monday-Friday   [address]     [phone]          [facility] dental Sliding fee If no insurance, call first.    [address], [place]     [phone]          [facility]  [address], [place]  [phone] Sliding fee Call 8:00 AM Mon-Thurs for next-day  appointments (for emergencies/pain only)Help with [place] is available.        [facility]  [address], [facility]  [phone] Call for fees Only for adult dental emergencies. Costs about 30% less than private practiceclinics  To sign up for the regular dental clinic, call [phone].        [facility])  [address]  [phone] Sliding fee scale For people without dental insurance only.Cleaning, xray, exams, fillings, sealants only - no extractions or root canals, etc. No walk-ins.        [facility] / [address]  [phone] No Fee No walk-ins, not accepting people with insurance. Extractions for uninsured people only. Call Friday 2PM to get on next week's list        [facility]   [address]  [phone] Sliding fee  $40 up front No walk-ins. Call at 8AM Mon-Fri for same- day visits. Can schedule Saturday emergency visits by calling Friday morning.   [facility]  [address]  [phone] Sliding fee  Call for details No walk-ins. For emergency appointments:  Callon Thursday 3PM (for Friday appt) OR Call on Friday 3PM (for Monday appt)        [facility]  [address] [place] [address]  [phone] Sliding fee -   Call for details Call on Tuesdays at 3PMto get an urgent Weds. appointment. Call anytime during business hours to schedule other appointments.               Your  blood pressure was elevated in the emergencydepartment today and requires recheck and close follow-up in your primary care clinic. Untreated blood pressure can cause serious complications including, but not limited to stroke, heart attack/failure, and kidney disease.  Please make a close follow-up appointment to have this recheck performed. Please return to the emergency department immediately if you develop a severe headache, vision changes, chest pain, shortness of breath, orabdominal pain.

## 2023-02-11 ENCOUNTER — HEALTH MAINTENANCE LETTER (OUTPATIENT)
Age: 23
End: 2023-02-11

## 2024-03-09 ENCOUNTER — HEALTH MAINTENANCE LETTER (OUTPATIENT)
Age: 24
End: 2024-03-09

## 2025-03-16 ENCOUNTER — HEALTH MAINTENANCE LETTER (OUTPATIENT)
Age: 25
End: 2025-03-16